# Patient Record
Sex: FEMALE | Race: BLACK OR AFRICAN AMERICAN | NOT HISPANIC OR LATINO | Employment: FULL TIME | URBAN - METROPOLITAN AREA
[De-identification: names, ages, dates, MRNs, and addresses within clinical notes are randomized per-mention and may not be internally consistent; named-entity substitution may affect disease eponyms.]

---

## 2017-12-29 ENCOUNTER — HOSPITAL ENCOUNTER (EMERGENCY)
Facility: HOSPITAL | Age: 57
Discharge: HOME | End: 2017-12-29
Attending: EMERGENCY MEDICINE
Payer: MEDICAID

## 2017-12-29 VITALS
BODY MASS INDEX: 29.82 KG/M2 | SYSTOLIC BLOOD PRESSURE: 161 MMHG | HEIGHT: 67 IN | RESPIRATION RATE: 18 BRPM | DIASTOLIC BLOOD PRESSURE: 91 MMHG | WEIGHT: 190 LBS | TEMPERATURE: 98 F | OXYGEN SATURATION: 98 % | HEART RATE: 69 BPM

## 2017-12-29 DIAGNOSIS — R51.9 NONINTRACTABLE HEADACHE, UNSPECIFIED CHRONICITY PATTERN, UNSPECIFIED HEADACHE TYPE: ICD-10-CM

## 2017-12-29 DIAGNOSIS — I10 HYPERTENSION, UNSPECIFIED TYPE: ICD-10-CM

## 2017-12-29 DIAGNOSIS — L03.032 CELLULITIS OF TOE OF LEFT FOOT: ICD-10-CM

## 2017-12-29 DIAGNOSIS — I49.9 IRREGULAR HEART RATE: Primary | ICD-10-CM

## 2017-12-29 DIAGNOSIS — R60.9 SWELLING: ICD-10-CM

## 2017-12-29 PROCEDURE — 99284 EMERGENCY DEPT VISIT MOD MDM: CPT | Mod: 25

## 2017-12-29 PROCEDURE — 25000003 PHARM REV CODE 250: Performed by: NURSE PRACTITIONER

## 2017-12-29 PROCEDURE — 93010 ELECTROCARDIOGRAM REPORT: CPT | Mod: ,,, | Performed by: INTERNAL MEDICINE

## 2017-12-29 PROCEDURE — 93005 ELECTROCARDIOGRAM TRACING: CPT

## 2017-12-29 RX ORDER — DOXYCYCLINE 100 MG/1
100 CAPSULE ORAL 2 TIMES DAILY
Qty: 14 CAPSULE | Refills: 0 | Status: SHIPPED | OUTPATIENT
Start: 2017-12-29 | End: 2018-01-05

## 2017-12-29 RX ORDER — DOXYCYCLINE HYCLATE 100 MG
100 TABLET ORAL
Status: COMPLETED | OUTPATIENT
Start: 2017-12-29 | End: 2017-12-29

## 2017-12-29 RX ORDER — ACETAMINOPHEN 500 MG
1000 TABLET ORAL
Status: COMPLETED | OUTPATIENT
Start: 2017-12-29 | End: 2017-12-29

## 2017-12-29 RX ADMIN — ACETAMINOPHEN 1000 MG: 500 TABLET ORAL at 08:12

## 2017-12-29 RX ADMIN — DOXYCYCLINE HYCLATE 100 MG: 100 TABLET, COATED ORAL at 09:12

## 2017-12-30 NOTE — DISCHARGE INSTRUCTIONS
You have been given her first dose of antibiotics in the emergency department.  Please get this prescription filled and take her next dose tomorrow morning.  You must finish the entire course of this even if you're feeling better.  Please follow-up with your cardiologist(number provided) for continued care.  You will need to follow up with a podiatrist regarding the cellulitis on her foot.  I have provided the number for you to contact.  Please follow-up with your primary care provider for continued care for your high blood pressure.    Please return to the Emergency Department for any new or worsening symptoms including: fever, chest pain, shortness of breath, loss of consciousness, dizziness, weakness, or any other concerns.     Please follow up with your Primary Care Provider within in the week. If you do not have one, you may contact the one listed on your discharge paperwork or you may also call the [facility] Appointment Desk at [phone] to schedule an appointment with one.     Please take all medication as prescribed.

## 2017-12-30 NOTE — ED PROVIDER NOTES
"Encounter Date: 12/29/2017       History     Chief Complaint   Patient presents with    Toe Pain     " My left big toe has been swollen since about December 10th. Dr Hodges wanted me to be further evaluated."     Headache     Posterior headache since 4pm. Radiates into neck. Sensitive to light/noise.      This is a 57-year-old female who presents to emergency department today for emergent evaluation of multiple complaints.  Patient reports she was seen at her chiropractor today and was given a list of complaints that he would like for her to follow up on.  She usually goes to [facility] for the majority of her care however she is here today to get "another opinion from a different facility".  Her complaints include left great toe pain, increased blood pressure, irregular heart rate, headache starting this morning.  She reports that on December 10 she hit her left great toe on the way home from work.  The next day she had a pedicure.  The following day after that she states that her foot and toes started to swell.  She was seen at [place] and diagnosed with gout was given medication, however this does not work.  She was then seen her primary care physician in which medication was changed and she had a steroid Dosepak, and colchicine.  Her primary care physician also took out the HCTZ from her medication.  She reports that the swelling still exists and has remained unchanged.  She denies pain in that toe unless it is palpated.  She ambulates without difficulty.  Her second complaint was her elevated blood pressure.  Her diastolic was apparently over 100, however upon arrival to the emergency department it is 167/95.  She reportedly took her losartan, atenolol and baby aspirin at 1600.  Her third complaint would beat her regular heart rate.  She reports she was diagnosed with "irregular heart rate" in 2016 at that time she was hospitalized for 2 days.  And placed on aspirin.  She states her chiropractor was " concerned about her irregular heart rate however she denies any complications from this.  Denies shortness of breath, chest pain, dyspnea on exertion, calf pain or tenderness.  Her last complaint would be headache since about 8:00 this morning.  She reports taking no medication for this.  She describes the pain as a throbbing pain 7/10 in the occipital area.  She states that she has had intermittent blurry vision for 2 months which lasts approximately 30 seconds in which the wording she is reading it hazy.  She denies any other complications regarding this.  She has not slept with her primary care physician about this          Review of patient's allergies indicates:  No Known Allergies  Past Medical History:   Diagnosis Date    Hypertension      History reviewed. No pertinent surgical history.  Family History   Problem Relation Age of Onset    Breast cancer Mother     Colon cancer Brother     Breast cancer Cousin     Breast cancer Maternal Aunt     Ovarian cancer Neg Hx      Social History   Substance Use Topics    Smoking status: Never Smoker    Smokeless tobacco: Never Used    Alcohol use No     Review of Systems   Constitutional: Negative for activity change, appetite change, chills, diaphoresis, fatigue, fever and unexpected weight change.   HENT: Negative for congestion, dental problem, ear discharge, ear pain, postnasal drip, rhinorrhea, sinus pain, sinus pressure, sore throat and tinnitus.    Eyes: Positive for visual disturbance (blurry vision lasting 30 seconds ×2 months). Negative for photophobia, pain and redness.   Respiratory: Negative for apnea, cough, choking, chest tightness, shortness of breath and wheezing.    Cardiovascular: Negative for chest pain, palpitations and leg swelling.   Gastrointestinal: Negative for abdominal distention, abdominal pain, blood in stool, constipation, diarrhea, nausea, rectal pain and vomiting.   Genitourinary: Negative for decreased urine volume, difficulty  urinating, dyspareunia, dysuria, flank pain, frequency and urgency.   Musculoskeletal: Negative for arthralgias, back pain, gait problem, joint swelling, myalgias, neck pain and neck stiffness.   Skin: Negative for rash.   Neurological: Positive for headaches. Negative for dizziness, tremors, seizures, syncope, facial asymmetry, speech difficulty, weakness, light-headedness and numbness.   Hematological: Negative for adenopathy. Does not bruise/bleed easily.       Physical Exam     Initial Vitals [12/29/17 1811]   BP Pulse Resp Temp SpO2   (!) 167/95 70 16 97.9 °F (36.6 °C) 99 %      MAP       119         Physical Exam    Nursing note and vitals reviewed.  Constitutional: Vital signs are normal. She appears well-developed and well-nourished. She is cooperative.  Non-toxic appearance. She does not have a sickly appearance. She does not appear ill. No distress.   HENT:   Head: Normocephalic and atraumatic.   Right Ear: External ear normal.   Left Ear: External ear normal.   Nose: Nose normal.   Mouth/Throat: Oropharynx is clear and moist. No oropharyngeal exudate.   Eyes: Conjunctivae and EOM are normal. Pupils are equal, round, and reactive to light. No scleral icterus.   Neck: Normal range of motion and full passive range of motion without pain. Neck supple. No spinous process tenderness and no muscular tenderness present. No edema, no erythema and normal range of motion present. No neck rigidity.   Cardiovascular: Normal rate, normal heart sounds and normal pulses. A regularly irregular rhythm present.   No murmur heard.  Pulses:       Radial pulses are 2+ on the right side, and 2+ on the left side.        Popliteal pulses are 2+ on the right side, and 2+ on the left side.        Dorsalis pedis pulses are 2+ on the right side, and 2+ on the left side.        Posterior tibial pulses are 2+ on the right side, and 2+ on the left side.   Pulmonary/Chest: Effort normal and breath sounds normal. No respiratory distress.  She has no decreased breath sounds. She has no wheezes. She has no rhonchi. She exhibits no tenderness.   Abdominal: Soft. Normal appearance and bowel sounds are normal. She exhibits no distension and no mass. There is no tenderness. There is no rigidity, no rebound, no guarding, no CVA tenderness, no tenderness at McBurney's point and negative Bolden's sign.   Musculoskeletal: Normal range of motion.        Feet:    Lymphadenopathy:     She has no cervical adenopathy.   Neurological: She is alert and oriented to person, place, and time. She has normal strength and normal reflexes. No cranial nerve deficit or sensory deficit.   Skin: Skin is warm and dry. Capillary refill takes less than 2 seconds. No pallor.   Psychiatric: She has a normal mood and affect. Her behavior is normal. Judgment and thought content normal.         ED Course   Procedures  Labs Reviewed - No data to display  EKG Readings: (Independently Interpreted)   Initial Reading: No STEMI. Rhythm: Normal Sinus Rhythm. Heart Rate: 64. Ectopy: PVCs. Conduction: 1st Degree AV Block.       X-Rays:   Independently Interpreted Readings:   Other Readings:  X-ray of left foot shows mild soft tissue edema with no fracture or dislocation.  No bony erosion.  No foreign body noted.          APC / Resident Notes:   Valencia Gutierrez is a 57 y.o. female who presents to the Emergency Department with multiple medical complaints the most important of these would be her left great toe swelling.  She states that she has been dealing with this since December 10 and has followed up several times with different physicians.  States she was diagnosed with gout however the gout medications have not resolved the swelling.  Patient denies pain to this toe and is able to ambulate with out difficulty.  Patient had concern for her elevated blood pressure that  was 167/95 on arrival to the emergency department.  Patient's blood pressure medications were recently augmented by her physician  to eliminate HCTZ after her diagnosis of gout.  This was not replaced with another medication.  Patient was encouraged to follow up with PCP regarding his blood pressure.  Patient states she has concern for an irregular heart rate.  She has not followed up with cardiology since her hospitalization in 2016.  She states she is not sure what her irregular rhythm is that she is on aspirin daily.  Her EKG shows sinus rhythm with first-degree AV block with occasional PVCs.  She was encouraged to follow up with cardiology for continued management.  Patient also reports occipital headache that she describes as 7/10 throbbing pain which started this morning.  Patient denies taking any medication for this.  Patient was administered Tylenol in the emergency room and this pain resolved.  Patient reports having blurry vision for 2 months in which she states her vision becomes hazy for approximately 30 seconds while she is reading a computer screen.  She denies having any of these episodes in the emergency department.  She was encouraged to follow up with ophthalmology for eye exam and her primary care physician.     Physical Exam shows a non-toxic, afebrile, and well appearing female. Pertinent exam findings include 2+ edema to the left great toe extending up the medial aspect to the ankle.  Warmth is noted to the site.  Could be an area of fluctuance or cellulitis.  Slightly tender to palpate the DIP joint.  No pitting edema.  Cap refill remains less than 2 seconds.  Full range of motion, full strength, full sensation.  Able to ambulate without difficulty or pain.  No drainage noted to the site, skin remains intact.  Some pigment changes noted to medial aspect of great toe.  Lung sounds equal auscultated in all fields, equal air entry, normal effort.  Heart rate normal, rhythm irregular.  All distal pulses intact.  Cap refill less than 2 seconds.  Full range of motion noted in all extremities.  No cranial nerve  deficits.    Vital Signs Are Reassuring. If available, previous records reviewed.   RESULTS: X-ray of left foot shows mild soft tissue edema with no fracture or dislocation.  No bony erosion.  No foreign body noted.  Bedside ultrasound showed no fluid collection suggesting that this is probable cellulitis.  Will treat patient with doxycycline.  Provide podiatrist for follow-up.  Cardiology contact was provided to patient for follow-up of concern for blood pressure, heart rate.  Encouraged to follow up with PCP for blood pressure management and continued care.    My overall impression is cellulitis. Differential diagnosis includes but is not limited to osteomyelitis, abscess, rupture.     ED Course: Doxycycline, acetaminophen. D/C Meds: Doxycycline. Additional D/C Information: Patient was encouraged to take over-the-counter probiotic.  Stay well hydrated.  Rest and elevate foot.  Epson salt soaks.  This plan was discussed with patient and family and they all agree and understand the follow-up instructions. The diagnosis, treatment plan, instructions for follow-up and reevaluation with PCP as well as ED return precautions were discussed and understanding was verbalized. All questions or concerns have been addressed. Patient was discharged home with an instructional sheet which gave not only information regarding the most likely diagnoses but also information regarding when to return to the emergency department for alarming symptoms and when to seek further care.      This case was discussed with  Dr. Cuevas who is in agreement with my assessment and plan.                 ED Course      Clinical Impression:   The primary encounter diagnosis was Irregular heart rate. Diagnoses of Swelling, Cellulitis of toe of left foot, Hypertension, unspecified type, and Nonintractable headache, unspecified chronicity pattern, unspecified headache type were also pertinent to this visit.    Disposition:   Disposition:  Discharged  Condition: Stable                        Cara Lainez, NP  12/30/17 0108

## 2018-01-11 ENCOUNTER — OFFICE VISIT (OUTPATIENT)
Dept: CARDIOLOGY | Facility: CLINIC | Age: 58
End: 2018-01-11
Payer: MEDICAID

## 2018-01-11 VITALS
WEIGHT: 185.63 LBS | HEIGHT: 67 IN | DIASTOLIC BLOOD PRESSURE: 93 MMHG | BODY MASS INDEX: 29.13 KG/M2 | HEART RATE: 77 BPM | OXYGEN SATURATION: 98 % | SYSTOLIC BLOOD PRESSURE: 131 MMHG

## 2018-01-11 DIAGNOSIS — I10 ESSENTIAL HYPERTENSION: ICD-10-CM

## 2018-01-11 DIAGNOSIS — I49.9 IRREGULAR HEART RATE: Primary | ICD-10-CM

## 2018-01-11 PROCEDURE — 99204 OFFICE O/P NEW MOD 45 MIN: CPT | Mod: S$PBB,,, | Performed by: INTERNAL MEDICINE

## 2018-01-11 PROCEDURE — 99213 OFFICE O/P EST LOW 20 MIN: CPT | Mod: PBBFAC | Performed by: INTERNAL MEDICINE

## 2018-01-11 PROCEDURE — 99999 PR PBB SHADOW E&M-EST. PATIENT-LVL III: CPT | Mod: PBBFAC,,, | Performed by: INTERNAL MEDICINE

## 2018-01-11 RX ORDER — METOPROLOL SUCCINATE 50 MG/1
50 TABLET, EXTENDED RELEASE ORAL DAILY
Qty: 30 TABLET | Refills: 11 | Status: SHIPPED | OUTPATIENT
Start: 2018-01-11 | End: 2019-02-01 | Stop reason: SDUPTHER

## 2018-01-11 RX ORDER — POTASSIUM CHLORIDE 20 MEQ/15ML
SOLUTION ORAL DAILY
COMMUNITY

## 2018-01-11 RX ORDER — LOSARTAN POTASSIUM AND HYDROCHLOROTHIAZIDE 12.5; 5 MG/1; MG/1
1 TABLET ORAL DAILY
COMMUNITY

## 2018-01-11 RX ORDER — ASPIRIN 81 MG/1
81 TABLET ORAL DAILY
COMMUNITY

## 2018-01-11 NOTE — PROGRESS NOTES
"Subjective:    Patient ID:  Valencia Gutierrez is a 57 y.o. female who presents for evaluation of Hospital Follow Up      HPI     HTN, PVCs    Went to the ER 12/29/17  This is a 57-year-old female who presents to emergency department today for emergent evaluation of multiple complaints.  Patient reports she was seen at her chiropractor today and was given a list of complaints that he would like for her to follow up on.  She usually goes to [facility] for the majority of her care however she is here today to get "another opinion from a different facility".  Her complaints include left great toe pain, increased blood pressure, irregular heart rate, headache starting this morning.  She reports that on December 10 she hit her left great toe on the way home from work.  The next day she had a pedicure.  The following day after that she states that her foot and toes started to swell.  She was seen at [place] and diagnosed with gout was given medication, however this does not work.  She was then seen her primary care physician in which medication was changed and she had a steroid Dosepak, and colchicine.  Her primary care physician also took out the HCTZ from her medication.  She reports that the swelling still exists and has remained unchanged.  She denies pain in that toe unless it is palpated.  She ambulates without difficulty.  Her second complaint was her elevated blood pressure.  Her diastolic was apparently over 100, however upon arrival to the emergency department it is 167/95.  She reportedly took her losartan, atenolol and baby aspirin at 1600.  Her third complaint would beat her regular heart rate.  She reports she was diagnosed with "irregular heart rate" in 2016 at that time she was hospitalized for 2 days.  And placed on aspirin.  She states her chiropractor was concerned about her irregular heart rate however she denies any complications from this.  Denies shortness of breath, chest pain, dyspnea on exertion, " calf pain or tenderness.  Her last complaint would be headache since about 8:00 this morning.  She reports taking no medication for this.  She describes the pain as a throbbing pain 7/10 in the occipital area.  She states that she has had intermittent blurry vision for 2 months which lasts approximately 30 seconds in which the wording she is reading it hazy.  She denies any other complications regarding this.  She has not slept with her primary care physician about this    Valencia Gutierrez is a 57 y.o. female who presents to the Emergency Department with multiple medical complaints the most important of these would be her left great toe swelling.  She states that she has been dealing with this since December 10 and has followed up several times with different physicians.  States she was diagnosed with gout however the gout medications have not resolved the swelling.  Patient denies pain to this toe and is able to ambulate with out difficulty.  Patient had concern for her elevated blood pressure that  was 167/95 on arrival to the emergency department.  Patient's blood pressure medications were recently augmented by her physician to eliminate HCTZ after her diagnosis of gout.  This was not replaced with another medication.  Patient was encouraged to follow up with PCP regarding his blood pressure.  Patient states she has concern for an irregular heart rate.  She has not followed up with cardiology since her hospitalization in 2016.  She states she is not sure what her irregular rhythm is that she is on aspirin daily.  Her EKG shows sinus rhythm with first-degree AV block with occasional PVCs.  She was encouraged to follow up with cardiology for continued management.  Patient also reports occipital headache that she describes as 7/10 throbbing pain which started this morning.  Patient denies taking any medication for this.  Patient was administered Tylenol in the emergency room and this pain resolved.  Patient reports having  blurry vision for 2 months in which she states her vision becomes hazy for approximately 30 seconds while she is reading a computer screen.  She denies having any of these episodes in the emergency department.  She was encouraged to follow up with ophthalmology for eye exam and her primary care physician.      Physical Exam shows a non-toxic, afebrile, and well appearing female. Pertinent exam findings include 2+ edema to the left great toe extending up the medial aspect to the ankle.  Warmth is noted to the site.  Could be an area of fluctuance or cellulitis.  Slightly tender to palpate the DIP joint.  No pitting edema.  Cap refill remains less than 2 seconds.  Full range of motion, full strength, full sensation.  Able to ambulate without difficulty or pain.  No drainage noted to the site, skin remains intact.  Some pigment changes noted to medial aspect of great toe.  Lung sounds equal auscultated in all fields, equal air entry, normal effort.  Heart rate normal, rhythm irregular.  All distal pulses intact.  Cap refill less than 2 seconds.  Full range of motion noted in all extremities.  No cranial nerve deficits.     Vital Signs Are Reassuring. If available, previous records reviewed.   RESULTS: X-ray of left foot shows mild soft tissue edema with no fracture or dislocation.  No bony erosion.  No foreign body noted.  Bedside ultrasound showed no fluid collection suggesting that this is probable cellulitis.  Will treat patient with doxycycline.  Provide podiatrist for follow-up.  Cardiology contact was provided to patient for follow-up of concern for blood pressure, heart rate.  Encouraged to follow up with PCP for blood pressure management and continued care.     My overall impression is cellulitis. Differential diagnosis includes but is not limited to osteomyelitis, abscess, rupture.      ED Course: Doxycycline, acetaminophen. D/C Meds: Doxycycline. Additional D/C Information: Patient was encouraged to take  over-the-counter probiotic.  Stay well hydrated.  Rest and elevate foot.  Epson salt soaks.  This plan was discussed with patient and family and they all agree and understand the follow-up instructions. The diagnosis, treatment plan, instructions for follow-up and reevaluation with PCP as well as ED return precautions were discussed and understanding was verbalized. All questions or concerns have been addressed. Patient was discharged home with an instructional sheet which gave not only information regarding the most likely diagnoses but also information regarding when to return to the emergency department for alarming symptoms and when to seek further care.    EKG 12/29/17 NSR with PVCs    Denies CP  Gets fatigue  Put herself back on HCTZ due to uncontrolled BP  BP stable today      Review of Systems   Constitution: Negative for decreased appetite.   HENT: Negative for ear discharge.    Eyes: Negative for blurred vision.   Respiratory: Negative for hemoptysis.    Endocrine: Negative for polyphagia.   Hematologic/Lymphatic: Negative for adenopathy.   Skin: Negative for color change.   Musculoskeletal: Negative for joint swelling.   Neurological: Negative for brief paralysis.   Psychiatric/Behavioral: Negative for hallucinations.        Objective:    Physical Exam   Constitutional: She is oriented to person, place, and time. She appears well-developed and well-nourished.   HENT:   Head: Normocephalic and atraumatic.   Eyes: Conjunctivae are normal. Pupils are equal, round, and reactive to light.   Neck: Normal range of motion. Neck supple.   Cardiovascular: Normal rate, normal heart sounds and intact distal pulses.    Pulmonary/Chest: Effort normal and breath sounds normal.   Abdominal: Soft. Bowel sounds are normal.   Musculoskeletal: Normal range of motion.   Neurological: She is alert and oriented to person, place, and time.   Skin: Skin is warm and dry.         Assessment:       1. Irregular heart rate    2.  Essential hypertension         Plan:       Echo and holter for PVCs and HTN  Switch atenolol to toprol XL 50 qd

## 2018-01-18 ENCOUNTER — HOSPITAL ENCOUNTER (OUTPATIENT)
Dept: CARDIOLOGY | Facility: HOSPITAL | Age: 58
Discharge: HOME | End: 2018-01-18
Attending: INTERNAL MEDICINE
Payer: MEDICAID

## 2018-01-18 DIAGNOSIS — I10 ESSENTIAL HYPERTENSION: ICD-10-CM

## 2018-01-18 DIAGNOSIS — I49.9 IRREGULAR HEART RATE: ICD-10-CM

## 2018-01-18 LAB
DIASTOLIC DYSFUNCTION: NO
ESTIMATED PA SYSTOLIC PRESSURE: 28.4
GLOBAL PERICARDIAL EFFUSION: NORMAL
MITRAL VALVE REGURGITATION: NORMAL
RETIRED EF AND QEF - SEE NOTES: 55 (ref 55–65)
TRICUSPID VALVE REGURGITATION: NORMAL

## 2018-01-18 PROCEDURE — 93226 XTRNL ECG REC<48 HR SCAN A/R: CPT

## 2018-01-18 PROCEDURE — 93306 TTE W/DOPPLER COMPLETE: CPT

## 2018-01-18 PROCEDURE — 93227 XTRNL ECG REC<48 HR R&I: CPT | Mod: ,,, | Performed by: INTERNAL MEDICINE

## 2018-01-18 PROCEDURE — 93306 TTE W/DOPPLER COMPLETE: CPT | Mod: 26,,, | Performed by: INTERNAL MEDICINE

## 2018-01-25 ENCOUNTER — OFFICE VISIT (OUTPATIENT)
Dept: CARDIOLOGY | Facility: CLINIC | Age: 58
End: 2018-01-25
Payer: MEDICAID

## 2018-01-25 VITALS
DIASTOLIC BLOOD PRESSURE: 78 MMHG | BODY MASS INDEX: 29.69 KG/M2 | SYSTOLIC BLOOD PRESSURE: 154 MMHG | RESPIRATION RATE: 15 BRPM | OXYGEN SATURATION: 100 % | WEIGHT: 189.13 LBS | HEIGHT: 67 IN | HEART RATE: 62 BPM

## 2018-01-25 DIAGNOSIS — I49.3 FREQUENT PVCS: ICD-10-CM

## 2018-01-25 DIAGNOSIS — I10 ESSENTIAL HYPERTENSION: Primary | ICD-10-CM

## 2018-01-25 PROCEDURE — 99213 OFFICE O/P EST LOW 20 MIN: CPT | Mod: S$PBB,,, | Performed by: INTERNAL MEDICINE

## 2018-01-25 PROCEDURE — 99999 PR PBB SHADOW E&M-EST. PATIENT-LVL IV: CPT | Mod: PBBFAC,,, | Performed by: INTERNAL MEDICINE

## 2018-01-25 PROCEDURE — 99214 OFFICE O/P EST MOD 30 MIN: CPT | Mod: PBBFAC | Performed by: INTERNAL MEDICINE

## 2018-01-25 NOTE — PROGRESS NOTES
"Subjective:    Patient ID:  Valencia Gutierrez is a 57 y.o. female who presents for follow-up of Results      HPI     HTN, PVCs    Echo 1/18/18    1 - Normal left ventricular systolic function (EF 55-60%).     Holter 1/18/18  1. Sinus rhythm with heart rates varying between 48 and 150 bpm with an average of 81 bpm.     VENTRICULAR ARRHYTHMIAS  1. There were very frequent PVCs totalling 5856 and averaging 244 per hour.  There were 52 couplets.    2. There were no episodes of ventricular tachycardia.    SUPRA VENTRICULAR ARRHYTHMIAS  1. There was no supraventricular ectopic activity.    SINUS NODE FUNCTION  1. There was no evidence of high grade SA kiki block.     AV CONDUCTION  1. There was no evidence of high grade AV block.      Went to the ER 12/29/17  This is a 57-year-old female who presents to emergency department today for emergent evaluation of multiple complaints.  Patient reports she was seen at her chiropractor today and was given a list of complaints that he would like for her to follow up on.  She usually goes to [facility] for the majority of her care however she is here today to get "another opinion from a different facility".  Her complaints include left great toe pain, increased blood pressure, irregular heart rate, headache starting this morning.  She reports that on December 10 she hit her left great toe on the way home from work.  The next day she had a pedicure.  The following day after that she states that her foot and toes started to swell.  She was seen at [place] and diagnosed with gout was given medication, however this does not work.  She was then seen her primary care physician in which medication was changed and she had a steroid Dosepak, and colchicine.  Her primary care physician also took out the HCTZ from her medication.  She reports that the swelling still exists and has remained unchanged.  She denies pain in that toe unless it is palpated.  She ambulates without difficulty.  Her " "second complaint was her elevated blood pressure.  Her diastolic was apparently over 100, however upon arrival to the emergency department it is 167/95.  She reportedly took her losartan, atenolol and baby aspirin at 1600.  Her third complaint would beat her regular heart rate.  She reports she was diagnosed with "irregular heart rate" in 2016 at that time she was hospitalized for 2 days.  And placed on aspirin.  She states her chiropractor was concerned about her irregular heart rate however she denies any complications from this.  Denies shortness of breath, chest pain, dyspnea on exertion, calf pain or tenderness.  Her last complaint would be headache since about 8:00 this morning.  She reports taking no medication for this.  She describes the pain as a throbbing pain 7/10 in the occipital area.  She states that she has had intermittent blurry vision for 2 months which lasts approximately 30 seconds in which the wording she is reading it hazy.  She denies any other complications regarding this.  She has not slept with her primary care physician about this     Valencia Gutierrez is a 57 y.o. female who presents to the Emergency Department with multiple medical complaints the most important of these would be her left great toe swelling.  She states that she has been dealing with this since December 10 and has followed up several times with different physicians.  States she was diagnosed with gout however the gout medications have not resolved the swelling.  Patient denies pain to this toe and is able to ambulate with out difficulty.  Patient had concern for her elevated blood pressure that  was 167/95 on arrival to the emergency department.  Patient's blood pressure medications were recently augmented by her physician to eliminate HCTZ after her diagnosis of gout.  This was not replaced with another medication.  Patient was encouraged to follow up with PCP regarding his blood pressure.  Patient states she has concern for " an irregular heart rate.  She has not followed up with cardiology since her hospitalization in 2016.  She states she is not sure what her irregular rhythm is that she is on aspirin daily.  Her EKG shows sinus rhythm with first-degree AV block with occasional PVCs.  She was encouraged to follow up with cardiology for continued management.  Patient also reports occipital headache that she describes as 7/10 throbbing pain which started this morning.  Patient denies taking any medication for this.  Patient was administered Tylenol in the emergency room and this pain resolved.  Patient reports having blurry vision for 2 months in which she states her vision becomes hazy for approximately 30 seconds while she is reading a computer screen.  She denies having any of these episodes in the emergency department.  She was encouraged to follow up with ophthalmology for eye exam and her primary care physician.      Physical Exam shows a non-toxic, afebrile, and well appearing female. Pertinent exam findings include 2+ edema to the left great toe extending up the medial aspect to the ankle.  Warmth is noted to the site.  Could be an area of fluctuance or cellulitis.  Slightly tender to palpate the DIP joint.  No pitting edema.  Cap refill remains less than 2 seconds.  Full range of motion, full strength, full sensation.  Able to ambulate without difficulty or pain.  No drainage noted to the site, skin remains intact.  Some pigment changes noted to medial aspect of great toe.  Lung sounds equal auscultated in all fields, equal air entry, normal effort.  Heart rate normal, rhythm irregular.  All distal pulses intact.  Cap refill less than 2 seconds.  Full range of motion noted in all extremities.  No cranial nerve deficits.     Vital Signs Are Reassuring. If available, previous records reviewed.   RESULTS: X-ray of left foot shows mild soft tissue edema with no fracture or dislocation.  No bony erosion.  No foreign body noted.   Bedside ultrasound showed no fluid collection suggesting that this is probable cellulitis.  Will treat patient with doxycycline.  Provide podiatrist for follow-up.  Cardiology contact was provided to patient for follow-up of concern for blood pressure, heart rate.  Encouraged to follow up with PCP for blood pressure management and continued care.     My overall impression is cellulitis. Differential diagnosis includes but is not limited to osteomyelitis, abscess, rupture.      ED Course: Doxycycline, acetaminophen. D/C Meds: Doxycycline. Additional D/C Information: Patient was encouraged to take over-the-counter probiotic.  Stay well hydrated.  Rest and elevate foot.  Epson salt soaks.  This plan was discussed with patient and family and they all agree and understand the follow-up instructions. The diagnosis, treatment plan, instructions for follow-up and reevaluation with PCP as well as ED return precautions were discussed and understanding was verbalized. All questions or concerns have been addressed. Patient was discharged home with an instructional sheet which gave not only information regarding the most likely diagnoses but also information regarding when to return to the emergency department for alarming symptoms and when to seek further care.    Denies CP or SOB  Denies palpitations       Review of Systems   Constitution: Negative for decreased appetite.   HENT: Negative for ear discharge.    Eyes: Negative for blurred vision.   Respiratory: Negative for hemoptysis.    Endocrine: Negative for polyphagia.   Hematologic/Lymphatic: Negative for adenopathy.   Skin: Negative for color change.   Musculoskeletal: Negative for joint swelling.   Neurological: Negative for brief paralysis.   Psychiatric/Behavioral: Negative for hallucinations.        Objective:    Physical Exam   Constitutional: She is oriented to person, place, and time. She appears well-developed and well-nourished.   HENT:   Head: Normocephalic and  atraumatic.   Eyes: Conjunctivae are normal. Pupils are equal, round, and reactive to light.   Neck: Normal range of motion. Neck supple.   Cardiovascular: Normal rate, normal heart sounds and intact distal pulses.    Pulmonary/Chest: Effort normal and breath sounds normal.   Abdominal: Soft. Bowel sounds are normal.   Musculoskeletal: Normal range of motion.   Neurological: She is alert and oriented to person, place, and time.   Skin: Skin is warm and dry.         Assessment:       1. Essential hypertension    2. Frequent PVCs         Plan:       PVCs appear to asymptomatic - continue with metoprolol  OV 6 months

## 2019-02-01 RX ORDER — METOPROLOL SUCCINATE 50 MG/1
50 TABLET, EXTENDED RELEASE ORAL DAILY
Qty: 30 TABLET | Refills: 11 | Status: SHIPPED | OUTPATIENT
Start: 2019-02-01 | End: 2020-02-01

## 2019-11-27 PROBLEM — J30.1 SEASONAL ALLERGIC RHINITIS DUE TO POLLEN: Status: ACTIVE | Noted: 2019-03-07

## 2019-11-27 PROBLEM — I47.10 PSVT (PAROXYSMAL SUPRAVENTRICULAR TACHYCARDIA): Status: ACTIVE | Noted: 2019-02-21

## 2019-11-27 PROBLEM — G47.00 INSOMNIA: Status: ACTIVE | Noted: 2019-02-21

## 2019-11-27 PROBLEM — M51.16 LUMBAR DISC DISEASE WITH RADICULOPATHY: Status: ACTIVE | Noted: 2019-06-13

## 2019-11-27 PROBLEM — M1A.09X0 IDIOPATHIC CHRONIC GOUT OF MULTIPLE SITES WITHOUT TOPHUS: Status: ACTIVE | Noted: 2019-02-21

## 2020-02-19 RX ORDER — METOPROLOL SUCCINATE 50 MG/1
TABLET, EXTENDED RELEASE ORAL
Qty: 90 TABLET | Refills: 3 | Status: SHIPPED | OUTPATIENT
Start: 2020-02-19 | End: 2020-12-07

## 2020-12-07 ENCOUNTER — OFFICE VISIT (OUTPATIENT)
Dept: CARDIOLOGY | Facility: CLINIC | Age: 60
End: 2020-12-07
Payer: MEDICAID

## 2020-12-07 VITALS
BODY MASS INDEX: 32.87 KG/M2 | DIASTOLIC BLOOD PRESSURE: 88 MMHG | HEIGHT: 67 IN | OXYGEN SATURATION: 96 % | HEART RATE: 69 BPM | WEIGHT: 209.44 LBS | SYSTOLIC BLOOD PRESSURE: 110 MMHG

## 2020-12-07 DIAGNOSIS — I10 BENIGN HYPERTENSION: ICD-10-CM

## 2020-12-07 DIAGNOSIS — I49.3 FREQUENT PVCS: ICD-10-CM

## 2020-12-07 DIAGNOSIS — Z76.89 ESTABLISHING CARE WITH NEW DOCTOR, ENCOUNTER FOR: ICD-10-CM

## 2020-12-07 DIAGNOSIS — I49.9 IRREGULAR HEART RATE: Primary | ICD-10-CM

## 2020-12-07 DIAGNOSIS — I47.10 PSVT (PAROXYSMAL SUPRAVENTRICULAR TACHYCARDIA): ICD-10-CM

## 2020-12-07 PROCEDURE — 99214 OFFICE O/P EST MOD 30 MIN: CPT | Mod: PBBFAC | Performed by: INTERNAL MEDICINE

## 2020-12-07 PROCEDURE — 99214 PR OFFICE/OUTPT VISIT, EST, LEVL IV, 30-39 MIN: ICD-10-PCS | Mod: S$PBB,,, | Performed by: INTERNAL MEDICINE

## 2020-12-07 PROCEDURE — 93005 ELECTROCARDIOGRAM TRACING: CPT | Mod: PBBFAC | Performed by: INTERNAL MEDICINE

## 2020-12-07 PROCEDURE — 99214 OFFICE O/P EST MOD 30 MIN: CPT | Mod: S$PBB,,, | Performed by: INTERNAL MEDICINE

## 2020-12-07 PROCEDURE — 93010 ELECTROCARDIOGRAM REPORT: CPT | Mod: S$PBB,,, | Performed by: INTERNAL MEDICINE

## 2020-12-07 PROCEDURE — 93010 EKG 12-LEAD: ICD-10-PCS | Mod: S$PBB,,, | Performed by: INTERNAL MEDICINE

## 2020-12-07 PROCEDURE — 99999 PR PBB SHADOW E&M-EST. PATIENT-LVL IV: ICD-10-PCS | Mod: PBBFAC,,, | Performed by: INTERNAL MEDICINE

## 2020-12-07 PROCEDURE — 99999 PR PBB SHADOW E&M-EST. PATIENT-LVL IV: CPT | Mod: PBBFAC,,, | Performed by: INTERNAL MEDICINE

## 2020-12-07 NOTE — PROGRESS NOTES
Subjective:    Patient ID:  Valencia Gutierrez is a 60 y.o. female who presents for follow-up of Hypertension      HPI     HTN, PVCs     Echo 1/18/18    1 - Normal left ventricular systolic function (EF 55-60%).      Holter 1/18/18  1. Sinus rhythm with heart rates varying between 48 and 150 bpm with an average of 81 bpm.     VENTRICULAR ARRHYTHMIAS  1. There were very frequent PVCs totalling 5856 and averaging 244 per hour.  There were 52 couplets.    2. There were no episodes of ventricular tachycardia.    SUPRA VENTRICULAR ARRHYTHMIAS  1. There was no supraventricular ectopic activity.    SINUS NODE FUNCTION  1. There was no evidence of high grade SA kiki block.     AV CONDUCTION  1. There was no evidence of high grade AV block.         1/25/18 Denies CP or SOB  Denies palpitations  PVCs appear to asymptomatic - continue with metoprolol  OV 6 months    12/7/20 Took herself off of metoprolol about 1 year ago  Denies CP, SOB, or palpitations  EKG NSR 1st degree AVB PRWP    Review of Systems   Constitution: Negative for decreased appetite.   HENT: Negative for ear discharge.    Eyes: Negative for blurred vision.   Respiratory: Negative for hemoptysis.    Endocrine: Negative for polyphagia.   Hematologic/Lymphatic: Negative for adenopathy.   Skin: Negative for color change.   Musculoskeletal: Negative for joint swelling.   Genitourinary: Negative for bladder incontinence.   Neurological: Negative for brief paralysis.   Psychiatric/Behavioral: Negative for hallucinations.   Allergic/Immunologic: Negative for hives.        Objective:    Physical Exam   Constitutional: She is oriented to person, place, and time. She appears well-developed and well-nourished.   HENT:   Head: Normocephalic and atraumatic.   Eyes: Pupils are equal, round, and reactive to light. Conjunctivae are normal.   Neck: Normal range of motion. Neck supple.   Cardiovascular: Normal rate, normal heart sounds and intact distal pulses.   Pulmonary/Chest:  Effort normal and breath sounds normal.   Abdominal: Soft. Bowel sounds are normal.   Musculoskeletal: Normal range of motion.   Neurological: She is alert and oriented to person, place, and time.   Skin: Skin is warm and dry.         Assessment:       1. Irregular heart rate    2. Benign hypertension    3. Frequent PVCs    4. PSVT (paroxysmal supraventricular tachycardia)         Plan:       Off of metoprolol - will continue to monitor PVCs/SVT  Continue Rx for HTN  OV 1 year

## 2021-04-16 ENCOUNTER — PATIENT MESSAGE (OUTPATIENT)
Dept: RESEARCH | Facility: HOSPITAL | Age: 61
End: 2021-04-16

## 2021-06-28 ENCOUNTER — OFFICE VISIT (OUTPATIENT)
Dept: CARDIOLOGY | Facility: CLINIC | Age: 61
End: 2021-06-28
Payer: MEDICAID

## 2021-06-28 VITALS
HEIGHT: 67 IN | HEART RATE: 62 BPM | BODY MASS INDEX: 32.17 KG/M2 | DIASTOLIC BLOOD PRESSURE: 80 MMHG | SYSTOLIC BLOOD PRESSURE: 102 MMHG | WEIGHT: 204.94 LBS | OXYGEN SATURATION: 99 %

## 2021-06-28 DIAGNOSIS — I47.10 PSVT (PAROXYSMAL SUPRAVENTRICULAR TACHYCARDIA): ICD-10-CM

## 2021-06-28 DIAGNOSIS — I10 BENIGN HYPERTENSION: ICD-10-CM

## 2021-06-28 DIAGNOSIS — I49.3 FREQUENT PVCS: Primary | ICD-10-CM

## 2021-06-28 PROCEDURE — 99999 PR PBB SHADOW E&M-EST. PATIENT-LVL IV: ICD-10-PCS | Mod: PBBFAC,,, | Performed by: INTERNAL MEDICINE

## 2021-06-28 PROCEDURE — 93010 ELECTROCARDIOGRAM REPORT: CPT | Mod: S$PBB,,, | Performed by: INTERNAL MEDICINE

## 2021-06-28 PROCEDURE — 93010 EKG 12-LEAD: ICD-10-PCS | Mod: S$PBB,,, | Performed by: INTERNAL MEDICINE

## 2021-06-28 PROCEDURE — 99214 OFFICE O/P EST MOD 30 MIN: CPT | Mod: PBBFAC,25 | Performed by: INTERNAL MEDICINE

## 2021-06-28 PROCEDURE — 99214 OFFICE O/P EST MOD 30 MIN: CPT | Mod: S$PBB,,, | Performed by: INTERNAL MEDICINE

## 2021-06-28 PROCEDURE — 99999 PR PBB SHADOW E&M-EST. PATIENT-LVL IV: CPT | Mod: PBBFAC,,, | Performed by: INTERNAL MEDICINE

## 2021-06-28 PROCEDURE — 93005 ELECTROCARDIOGRAM TRACING: CPT | Mod: PBBFAC | Performed by: INTERNAL MEDICINE

## 2021-06-28 PROCEDURE — 99214 PR OFFICE/OUTPT VISIT, EST, LEVL IV, 30-39 MIN: ICD-10-PCS | Mod: S$PBB,,, | Performed by: INTERNAL MEDICINE

## 2021-06-28 RX ORDER — METOPROLOL SUCCINATE 50 MG/1
50 TABLET, EXTENDED RELEASE ORAL DAILY
Qty: 90 TABLET | Refills: 3 | Status: SHIPPED | OUTPATIENT
Start: 2021-06-28